# Patient Record
Sex: FEMALE | Race: WHITE | Employment: OTHER | URBAN - METROPOLITAN AREA
[De-identification: names, ages, dates, MRNs, and addresses within clinical notes are randomized per-mention and may not be internally consistent; named-entity substitution may affect disease eponyms.]

---

## 2017-10-17 DIAGNOSIS — I10 ESSENTIAL HYPERTENSION, BENIGN: Primary | ICD-10-CM

## 2017-10-17 RX ORDER — LISINOPRIL 10 MG/1
10 TABLET ORAL DAILY
Qty: 30 TABLET | Refills: 0 | Status: SHIPPED | OUTPATIENT
Start: 2017-10-17 | End: 2017-10-24 | Stop reason: DRUGHIGH

## 2017-10-17 NOTE — TELEPHONE ENCOUNTER
Patient has upcoming appt and needs a fill to hold her over till then.    lisinopril (PRINIVIL,ZESTRIL) 10 MG tablet      Last Written Prescription Date: 09/27/2016  Last Fill Quantity: 90, # refills: 3    Last Office Visit with [facility], [facility] or [facility] prescribing provider:  09/27/2016   Future Office Visit:    Next 5 appointments (look out 90 days)     Oct 24, 2017  8:40 AM CDT   Office Visit with Timo Ambrocio MD   [facility] [place] [place]    [address] [address] [phone]                    BP Readings from Last 3 Encounters:   09/27/16 (!) 148/92   04/09/15 140/80   03/16/15 (!) 144/92

## 2017-10-24 ENCOUNTER — OFFICE VISIT (OUTPATIENT)
Dept: INTERNAL MEDICINE | Facility: CLINIC | Age: 72
End: 2017-10-24
Payer: COMMERCIAL

## 2017-10-24 VITALS
SYSTOLIC BLOOD PRESSURE: 150 MMHG | TEMPERATURE: 97.7 F | OXYGEN SATURATION: 98 % | WEIGHT: 161.2 LBS | HEIGHT: 65 IN | HEART RATE: 103 BPM | BODY MASS INDEX: 26.86 KG/M2 | DIASTOLIC BLOOD PRESSURE: 80 MMHG

## 2017-10-24 DIAGNOSIS — I10 ESSENTIAL HYPERTENSION, BENIGN: Primary | ICD-10-CM

## 2017-10-24 DIAGNOSIS — Z12.31 VISIT FOR SCREENING MAMMOGRAM: ICD-10-CM

## 2017-10-24 DIAGNOSIS — Z71.89 COUNSELING REGARDING ADVANCED DIRECTIVES: ICD-10-CM

## 2017-10-24 DIAGNOSIS — Z23 NEED FOR PNEUMOCOCCAL VACCINATION: ICD-10-CM

## 2017-10-24 DIAGNOSIS — E78.5 HYPERLIPIDEMIA LDL GOAL <130: ICD-10-CM

## 2017-10-24 LAB
ALBUMIN SERPL-MCNC: 3.8 G/DL (ref 3.4–5)
ALP SERPL-CCNC: 105 U/L (ref 40–150)
ALT SERPL W P-5'-P-CCNC: 21 U/L (ref 0–50)
ANION GAP SERPL CALCULATED.3IONS-SCNC: 9 MMOL/L (ref 3–14)
AST SERPL W P-5'-P-CCNC: 19 U/L (ref 0–45)
BILIRUB SERPL-MCNC: 0.4 MG/DL (ref 0.2–1.3)
BUN SERPL-MCNC: 13 MG/DL (ref 7–30)
CALCIUM SERPL-MCNC: 9.2 MG/DL (ref 8.5–10.1)
CHLORIDE SERPL-SCNC: 107 MMOL/L (ref 94–109)
CHOLEST SERPL-MCNC: 201 MG/DL
CO2 SERPL-SCNC: 26 MMOL/L (ref 20–32)
CREAT SERPL-MCNC: 0.71 MG/DL (ref 0.52–1.04)
GFR SERPL CREATININE-BSD FRML MDRD: 81 ML/MIN/1.7M2
GLUCOSE SERPL-MCNC: 90 MG/DL (ref 70–99)
HDLC SERPL-MCNC: 93 MG/DL
LDLC SERPL CALC-MCNC: 93 MG/DL
NONHDLC SERPL-MCNC: 108 MG/DL
POTASSIUM SERPL-SCNC: 4.1 MMOL/L (ref 3.4–5.3)
PROT SERPL-MCNC: 7.4 G/DL (ref 6.8–8.8)
SODIUM SERPL-SCNC: 142 MMOL/L (ref 133–144)
TRIGL SERPL-MCNC: 73 MG/DL

## 2017-10-24 PROCEDURE — G0009 ADMIN PNEUMOCOCCAL VACCINE: HCPCS | Performed by: INTERNAL MEDICINE

## 2017-10-24 PROCEDURE — 90670 PCV13 VACCINE IM: CPT | Performed by: INTERNAL MEDICINE

## 2017-10-24 PROCEDURE — 99214 OFFICE O/P EST MOD 30 MIN: CPT | Performed by: INTERNAL MEDICINE

## 2017-10-24 PROCEDURE — 80061 LIPID PANEL: CPT | Performed by: INTERNAL MEDICINE

## 2017-10-24 PROCEDURE — 80053 COMPREHEN METABOLIC PANEL: CPT | Performed by: INTERNAL MEDICINE

## 2017-10-24 PROCEDURE — 36415 COLL VENOUS BLD VENIPUNCTURE: CPT | Performed by: INTERNAL MEDICINE

## 2017-10-24 RX ORDER — LISINOPRIL 10 MG/1
10 TABLET ORAL DAILY
Qty: 90 TABLET | Refills: 3 | Status: CANCELLED | OUTPATIENT
Start: 2017-10-24

## 2017-10-24 RX ORDER — LISINOPRIL 20 MG/1
20 TABLET ORAL DAILY
Qty: 90 TABLET | Refills: 3 | Status: SHIPPED | OUTPATIENT
Start: 2017-10-24 | End: 2018-10-30

## 2017-10-24 NOTE — PROGRESS NOTES
SUBJECTIVE:   Leticia Forte is a 72 year old female who presents to clinic today for the following health issues:    Prevnar- Declines      Hypertension Follow-up      Outpatient blood pressures are not being checked.    Low Salt Diet: not monitoring salt        Amount of exercise or physical activity: Walking daily     Problems taking medications regularly: No    Medication side effects: none    Diet: regular (no restrictions)      Problem list and histories reviewed & adjusted, as indicated.  Additional history: as documented    Patient Active Problem List   Diagnosis     Vertigo     Hyperlipidemia LDL goal <130     Essential hypertension, benign     Counseling regarding advanced directives     History reviewed. No pertinent surgical history.    Social History   Substance Use Topics     Smoking status: Never Smoker     Smokeless tobacco: Never Used     Alcohol use Yes      Comment: Socially      Family History   Problem Relation Age of Onset     Family history unknown: Yes         Current Outpatient Prescriptions   Medication Sig Dispense Refill     lisinopril (PRINIVIL/ZESTRIL) 10 MG tablet Take 1 tablet (10 mg) by mouth daily 30 tablet 0     Allergies   Allergen Reactions     Codeine GI Disturbance     Sulfa Drugs Rash     BP Readings from Last 3 Encounters:   09/27/16 (!) 148/92   04/09/15 140/80   03/16/15 (!) 144/92    Wt Readings from Last 3 Encounters:   09/27/16 150 lb 14.4 oz (68.4 kg)   04/09/15 156 lb (70.8 kg)   03/16/15 171 lb (77.6 kg)         Labs reviewed in EPIC        Reviewed and updated as needed this visit by clinical staffTobacco  Allergies  Med Hx  Surg Hx  Fam Hx  Soc Hx      Reviewed and updated as needed this visit by Provider         ROS:  C: NEGATIVE for fever, chills, change in weight  E/M: NEGATIVE for ear, mouth and throat problems  R: NEGATIVE for significant cough or SOB  CV: NEGATIVE for chest pain, palpitations or peripheral edema  GI: NEGATIVE for nausea, abdominal pain,  "heartburn, or change in bowel habits  : NEGATIVE for frequency, dysuria, or hematuria  M: NEGATIVE for significant arthralgias or myalgia  H: NEGATIVE for bleeding problems  P: NEGATIVE for changes in mood or affect    OBJECTIVE:                                                    /80  Pulse 103  Temp 97.7  F (36.5  C) (Oral)  Ht 5' 5\" (1.651 m)  Wt 161 lb 3.2 oz (73.1 kg)  SpO2 98%  BMI 26.83 kg/m2  Body mass index is 26.83 kg/(m^2).  GENERAL: healthy, alert and no distress  EYES: Eyes grossly normal to inspection, extraocular movements - intact, and PERRL  HENT: ear canals- normal; TMs- normal; Nose- normal; Mouth- no ulcers, no lesions  NECK: no tenderness, no adenopathy, no asymmetry, no masses, no stiffness; thyroid- normal to palpation  RESP: lungs clear to auscultation - no rales, no rhonchi, no wheezes  CV: regular rates and rhythm, normal S1 S2, no S3 or S4 and no murmur, no click or rub -  MS: extremities- no gross deformities noted, no edema  NEURO: strength and tone- normal, sensory exam- grossly normal, mentation- intact, speech- normal, reflexes- symmetric  PSYCH: Alert and oriented times 3; speech- coherent , normal rate and volume; able to articulate logical thoughts, able to abstract reason, no tangential thoughts, no hallucinations or delusions, affect- normal     ASSESSMENT/PLAN:                                                      (I10) Essential hypertension, benign  (primary encounter diagnosis)  Comment: suggest increase Lisinopril to 20mg daily  Plan: Comprehensive metabolic panel, lisinopril         (PRINIVIL/ZESTRIL) 20 MG tablet        BP check 6 weeks    (E78.5) Hyperlipidemia LDL goal <130  Comment: labs as fasting  Plan: Comprehensive metabolic panel, Lipid Profile            (Z12.31) Visit for screening mammogram  Comment: advsied and ordered again  Plan: *MA Screening Digital Bilateral            (Z71.89) Counseling regarding advanced directives  Comment: advised  Plan: "     See Patient Instructions    Timo Ambrocio MD  [facility]    25 minutes spent with this patient, face to face, discussing treatment options for listed problems above as well as side effects of appropriate medications.  Counseling time extended beyond 50% of the clinic visit.  Medication dosing, treatment plan and follow-up were discussed. Also reviewed need for primary care testing for patient.

## 2017-10-24 NOTE — LETTER
[facility] [place]  [address] [address]  [phone]      10/24/2017       Leticia Jaureguilisa  [address] [facility] [address]        Dear Leticia,    I am pleased to inform you that your routine blood work including your sodium, potassium, calcium, kidney and liver function tests are all normal.    Your cholesterol looks good and I would not change anything at this point but would repeat your labs in 12 months.      Sincerely,      Timo Ambrocio MD  Internal Medicine

## 2017-10-24 NOTE — MR AVS SNAPSHOT
"              After Visit Summary   10/24/2017    Leticia Forte    MRN: [id number]           Patient Information     Date Of Birth          1945        Visit Information        Provider Department      10/24/2017 8:40 AM Timo Ambrocio MD [facility]        Today's Diagnoses     Essential hypertension, benign    -  1    Hyperlipidemia LDL goal <130        Visit for screening mammogram        Counseling regarding advanced directives           Follow-ups after your visit        Follow-up notes from your care team     Return if symptoms worsen or fail to improve, for BP Recheck.      Future tests that were ordered for you today     Open Future Orders        Priority Expected Expires Ordered    *MA Screening Digital Bilateral Routine  10/24/2018 10/24/2017            Who to contact     If you have questions or need follow up information about today's clinic visit or your schedule please contact [facility] directly at [phone].  Normal or non-critical lab and imaging results will be communicated to you by MyChart, letter or phone within 4 business days after the clinic has received the results. If you do not hear from us within 7 days, please contact the clinic through MyChart or phone. If you have a critical or abnormal lab result, we will notify you by phone as soon as possible.  Submit refill requests through [employer] or call your pharmacy and they will forward the refill request to us. Please allow 3 business days for your refill to be completed.          Additional Information About Your Visit        MyChart Information     [employer] lets you send messages to your doctor, view your test results, renew your prescriptions, schedule appointments and more. To sign up, go to [email or web address] . Click on \"Log in\" on the left side of the screen, which will take you to the Welcome page. Then click on \"Sign up Now\" on the right side of the page.     You will be " "asked to enter the access code listed below, as well as some personal information. Please follow the directions to create your username and password.     Your access code is: [email or web address]  Expires: 2018  9:13 AM     Your access code will  in 90 days. If you need help or a new code, please call your [place] clinic or [phone].        Care EveryWhere ID     This is your Care EveryWhere ID. This could be used by other organizations to access your [place] medical records  [id number]        Your Vitals Were     Pulse Temperature Height Pulse Oximetry BMI (Body Mass Index)       103 97.7  F (36.5  C) (Oral) 5' 5\" (1.651 m) 98% 26.83 kg/m2        Blood Pressure from Last 3 Encounters:   10/24/17 150/80   16 (!) 148/92   04/09/15 140/80    Weight from Last 3 Encounters:   10/24/17 161 lb 3.2 oz (73.1 kg)   16 150 lb 14.4 oz (68.4 kg)   04/09/15 156 lb (70.8 kg)              We Performed the Following     Comprehensive metabolic panel     Lipid Profile          Today's Medication Changes          These changes are accurate as of: 10/24/17  9:13 AM.  If you have any questions, ask your nurse or doctor.               These medicines have changed or have updated prescriptions.        Dose/Directions    lisinopril 20 MG tablet   Commonly known as:  PRINIVIL/ZESTRIL   This may have changed:    - medication strength  - how much to take   Used for:  Essential hypertension, benign   Changed by:  Timo Ambrocio MD        Dose:  20 mg   Take 1 tablet (20 mg) by mouth daily   Quantity:  90 tablet   Refills:  3            Where to get your medicines      These medications were sent to [id number]  [address] AT [facility] [address], [facility] [place] [address]     Phone:  [phone]     lisinopril 20 MG tablet                Primary Care Provider Office Phone # Fax #    Timo Ambrocio [phone] [address] [place] [address] " [place] [address]        Equal Access to Services     BERNADINE YUE : Hadii pedro reid javier Akins, warafaelda luqsaadha, qarossita waldemaryvettekathryn valdezbernardchristine shah. So [facility] [phone].    ATENCIÓN: Si habla español, tiene a de la o disposición servicios gratuitos de asistencia lingüística. Llame al [phone].    We comply with applicable federal civil rights laws and [place] laws. We do not discriminate on the basis of race, color, national origin, age, disability, sex, sexual orientation, or gender identity.            Thank you!     Thank you for choosing [facility]  for your care. Our goal is always to provide you with excellent care. Hearing back from our patients is one way we can continue to improve our services. Please take a few minutes to complete the written survey that you may receive in the mail after your visit with us. Thank you!             Your Updated Medication List - Protect others around you: Learn how to safely use, store and throw away your medicines at www.disposemymeds.org.          This list is accurate as of: 10/24/17  9:13 AM.  Always use your most recent med list.                   Brand Name Dispense Instructions for use Diagnosis    lisinopril 20 MG tablet    PRINIVIL/ZESTRIL    90 tablet    Take 1 tablet (20 mg) by mouth daily    Essential hypertension, benign

## 2018-03-21 ENCOUNTER — OFFICE VISIT (OUTPATIENT)
Dept: URGENT CARE | Facility: URGENT CARE | Age: 73
End: 2018-03-21
Payer: COMMERCIAL

## 2018-03-21 VITALS
TEMPERATURE: 97.8 F | OXYGEN SATURATION: 98 % | WEIGHT: 161 LBS | HEART RATE: 88 BPM | BODY MASS INDEX: 26.79 KG/M2 | RESPIRATION RATE: 18 BRPM | DIASTOLIC BLOOD PRESSURE: 80 MMHG | SYSTOLIC BLOOD PRESSURE: 110 MMHG

## 2018-03-21 DIAGNOSIS — R05.9 COUGH: Primary | ICD-10-CM

## 2018-03-21 PROCEDURE — 99213 OFFICE O/P EST LOW 20 MIN: CPT | Performed by: FAMILY MEDICINE

## 2018-03-21 RX ORDER — AMOXICILLIN 500 MG/1
500 CAPSULE ORAL 3 TIMES DAILY
Qty: 30 CAPSULE | Refills: 0 | Status: SHIPPED | OUTPATIENT
Start: 2018-03-21 | End: 2018-11-05

## 2018-03-21 NOTE — PROGRESS NOTES
SUBJECTIVE:   Leticia Forte is a 73 year old female presenting with a chief complaint of chills, cough - productive and facial pain/pressure.  Onset of symptoms was 2 week(s) ago.  Course of illness is worsening.    Severity moderately severe  Current and Associated symptoms: body aches  Treatment measures tried include Tylenol/Ibuprofen.  Predisposing factors include None.    No past medical history on file.  Current Outpatient Prescriptions   Medication Sig Dispense Refill     lisinopril (PRINIVIL/ZESTRIL) 20 MG tablet Take 1 tablet (20 mg) by mouth daily 90 tablet 3     Social History   Substance Use Topics     Smoking status: Never Smoker     Smokeless tobacco: Never Used     Alcohol use Yes      Comment: Socially        ROS:  INTEGUMENTARY/SKIN: NEGATIVE for worrisome rashes, moles or lesions  EYES: NEGATIVE for vision changes or irritation    OBJECTIVE:  /80  Pulse 88  Temp 97.8  F (36.6  C) (Oral)  Resp 18  Wt 161 lb (73 kg)  SpO2 98%  BMI 26.79 kg/m2  GENERAL APPEARANCE: healthy, alert and no distress  EYES: EOMI,  PERRL, conjunctiva clear  HENT: ear canals and TM's normal.  Nose and mouth without ulcers, erythema or lesions  NECK: supple, nontender, no lymphadenopathy  RESP: decreased breath sounds  CV: regular rates and rhythm, normal S1 S2, no murmur noted  NEURO: Normal strength and tone, sensory exam grossly normal,  normal speech and mentation  SKIN: no suspicious lesions or rashes    ASSESSMENT:  1. Cough  Symptomatic cares were discussed in detail.   Pt instructed to come back to the clinic for worsening sx    - amoxicillin (AMOXIL) 500 MG capsule; Take 1 capsule (500 mg) by mouth 3 times daily  Dispense: 30 capsule; Refill: 0

## 2018-03-21 NOTE — MR AVS SNAPSHOT
"              After Visit Summary   3/21/2018    Leticia Forte    MRN: [id number]           Patient Information     Date Of Birth          1945        Visit Information        Provider Department      3/21/2018 9:15 AM Alexey Singh MD [facility]        Today's Diagnoses     Cough    -  1       Follow-ups after your visit        Who to contact     If you have questions or need follow up information about today's clinic visit or your schedule please contact [facility] directly at [phone].  Normal or non-critical lab and imaging results will be communicated to you by [employer], letter or phone within 4 business days after the clinic has received the results. If you do not hear from us within 7 days, please contact the clinic through [employer] or phone. If you have a critical or abnormal lab result, we will notify you by phone as soon as possible.  Submit refill requests through [employer] or call your pharmacy and they will forward the refill request to us. Please allow 3 business days for your refill to be completed.          Additional Information About Your Visit        MyChart Information     [employer] lets you send messages to your doctor, view your test results, renew your prescriptions, schedule appointments and more. To sign up, go to [email or web address] . Click on \"Log in\" on the left side of the screen, which will take you to the Welcome page. Then click on \"Sign up Now\" on the right side of the page.     You will be asked to enter the access code listed below, as well as some personal information. Please follow the directions to create your username and password.     Your access code is: [email or web address]  Expires: 2018 10:03 AM     Your access code will  in 90 days. If you need help or a new code, please call your [place] clinic or [phone].        Care EveryWhere ID     This is your Care EveryWhere ID. This could be used by other " organizations to access your [facility] medical records  [id number]        Your Vitals Were     Pulse Temperature Respirations Pulse Oximetry BMI (Body Mass Index)       88 97.8  F (36.6  C) (Oral) 18 98% 26.79 kg/m2        Blood Pressure from Last 3 Encounters:   03/21/18 110/80   10/24/17 150/80   09/27/16 (!) 148/92    Weight from Last 3 Encounters:   03/21/18 161 lb (73 kg)   10/24/17 161 lb 3.2 oz (73.1 kg)   09/27/16 150 lb 14.4 oz (68.4 kg)              Today, you had the following     No orders found for display         Today's Medication Changes          These changes are accurate as of 3/21/18 10:03 AM.  If you have any questions, ask your nurse or doctor.               Start taking these medicines.        Dose/Directions    amoxicillin 500 MG capsule   Commonly known as:  AMOXIL   Used for:  Cough   Started by:  Alexey Singh MD        Dose:  500 mg   Take 1 capsule (500 mg) by mouth 3 times daily   Quantity:  30 capsule   Refills:  0            Where to get your medicines      These medications were sent to [facility] [address] [place] [address] [address] AT [place] [address] [address], [facility] [address]    Hours:  24-hours Phone:  [phone]     amoxicillin 500 MG capsule                Primary Care Provider Office Phone # Fax #    Timo Ambrocio [phone]       [address] [place] [address]        Equal Access to Services     BERNADINE TURNER AH: Hadii pedro reid hadasho Soomaali, waaxda luqadaha, qaybta kaalmada adeegyapari, waxay idijesus shah. So [facility] [phone].    ATENCIÓN: Si habla español, tiene a de la o disposición servicios gratuitos de asistencia lingüística. Llame al [phone].    We comply with applicable federal civil rights laws and [place] laws. We do not discriminate on the basis of race, color, national origin, age, disability, sex, sexual orientation, or gender identity.            Thank you!      Thank you for choosing [facility]  for your care. Our goal is always to provide you with excellent care. Hearing back from our patients is one way we can continue to improve our services. Please take a few minutes to complete the written survey that you may receive in the mail after your visit with us. Thank you!             Your Updated Medication List - Protect others around you: Learn how to safely use, store and throw away your medicines at www.disposemymeds.org.          This list is accurate as of 3/21/18 10:03 AM.  Always use your most recent med list.                   Brand Name Dispense Instructions for use Diagnosis    amoxicillin 500 MG capsule    AMOXIL    30 capsule    Take 1 capsule (500 mg) by mouth 3 times daily    Cough       lisinopril 20 MG tablet    PRINIVIL/ZESTRIL    90 tablet    Take 1 tablet (20 mg) by mouth daily    Essential hypertension, benign

## 2018-05-23 ENCOUNTER — TELEPHONE (OUTPATIENT)
Dept: INTERNAL MEDICINE | Facility: CLINIC | Age: 73
End: 2018-05-23

## 2018-05-23 NOTE — LETTER
[facility]  [address] [place] [address]  [phone]  May 23, 2018    Leticia Forte  [address] [facility] [address]    Dear Leticia,    We care about your health and based on a review of your medical records, recommend the the following, to better manage your health:      You are in particular need of attention regarding:  -Colon Cancer Screening  -Cervical Cancer Screening    I am recommending that you:     -schedule a MAMMOGRAM which is due.    1 in 8 women will develop invasive breast cancer during her lifetime and it is the most common non-skin cancer in American women.  EARLY detection, new treatments, and a better understanding of the disease have increased survival rates - the 5 year survival rate in the 1960s was 63% and today it is close to 90%.    If you are under/uninsured, we recommend you contact the [facility]. They offer mammograms at no charge or on a sliding fee charge. You can schedule with them at [phone]. Please have them send us the results.      Please disregard this reminder if you have had this exam elsewhere within the last year.  It would be helpful for us to have a copy of your mammogram report in your file so that we can best coordinate your care - please contact us with when your test was done so we can update your record.             -schedule a COLONOSCOPY to look for colon cancer (due every 10 years or 5 years in higher risk situations.)        Colon cancer is now the second leading cause of cancer-related deaths in the [place] for both men and women and there are over 130,000 new cases and 50,000 deaths per year from colon cancer.  Colonoscopies can prevent 90-95% of these deaths.  Problem lesions can be removed before they ever become cancer.  This test is not only looking for cancer, but also getting rid of precancerious lesions.    If you are under/uninsured, we recommend you contact  the [employer] program. [employer] is a free colorectal cancer screening program that provides colonoscopies for eligible under/uninsured [place] men and women. If you are interested in receiving a free colonoscopy, please call [employer] at [phone] (mention code [employer]) to see if you re eligible.      If you do not wish to do a colonoscopy or cannot afford to do one, at this time, there is another option. It is called a FIT test or Fecal Immunochemical Occult Blood Test (take home stool sample kit).  It does not replace the colonoscopy for colorectal cancer screening, but it can detect hidden bleeding in the lower colon.  It does need to be repeated every year and if a positive result is obtained, you would be referred for a colonoscopy.          If you have completed either one of these tests at another facility, please call with the details of when and where the tests were done and if they were normal or not. Or have the records sent to our clinic so that we can best coordinate your care.      Here is a list of Health Maintenance topics that are due now or due soon:  Health Maintenance Due   Topic Date Due     Tetanus Vaccine - every 10 years  02/07/1962     Colon Cancer Screening - every 10 years.  02/07/1994     Bone Density Screening (Dexa)  02/07/2009     Mammogram - every 2 years  03/30/2017       Please call us at [phone] or [phone] (or use [email or web address]) to address the above recommendations.     Thank you for trusting [facility].  We appreciate the opportunity to serve you and look forward to supporting your healthcare needs in the future.    If you have (or plan to have) any of these tests done at a facility other than a [facility] or a [facility], please have the results from these tests sent to your primary physician at [facility].    Healthy Regards,    Timo Ambrocio MD/Linda Perez CMA

## 2018-05-23 NOTE — TELEPHONE ENCOUNTER
Panel Management Review    Patient Active Problem List   Diagnosis     Vertigo     Hyperlipidemia LDL goal <130     Essential hypertension, benign     Counseling regarding advanced directives       Patient has the following on her problem list:     Hypertension   Last three blood pressure readings:  BP Readings from Last 3 Encounters:   03/21/18 110/80   10/24/17 150/80   09/27/16 (!) 148/92     Blood pressure: Passed    HTN Guidelines:  Age 18-59 BP range:  Less than 140/90  Age 60-85 with Diabetes:  Less than 140/90  Age 60-85 without Diabetes:  less than 150/90      Composite cancer screening  Chart review shows that this patient is due/due soon for the following Mammogram and Colonoscopy  Summary:    Patient is due/failing the following:   COLONOSCOPY and MAMMOGRAM    Action needed:   Patient needs office visit for mammo and colonoscopy.    Type of outreach:    Sent letter.    Questions for provider review:    None                                                                                                                                    Linda Perez CMA        .

## 2018-05-26 ENCOUNTER — TELEPHONE (OUTPATIENT)
Dept: INTERNAL MEDICINE | Facility: CLINIC | Age: 73
End: 2018-05-26

## 2018-05-26 NOTE — TELEPHONE ENCOUNTER
5/26/2018    Call Regarding [facility] Mammogram    Attempt 1    Message on voicemail    Patient is also due for  Outreach   SV

## 2018-10-30 DIAGNOSIS — I10 ESSENTIAL HYPERTENSION, BENIGN: ICD-10-CM

## 2018-10-30 RX ORDER — LISINOPRIL 20 MG/1
20 TABLET ORAL DAILY
Qty: 30 TABLET | Refills: 0 | Status: SHIPPED | OUTPATIENT
Start: 2018-10-30 | End: 2018-11-04

## 2018-10-30 NOTE — TELEPHONE ENCOUNTER
Prescription approved per [facility].  Has upcoming appointment 11/5/18

## 2018-10-30 NOTE — TELEPHONE ENCOUNTER
Reason for Call:  Medication or medication refill    Do you use a [facility]?  Name of the pharmacy and phone number for the current request:  [facility] [address] [place] - [phone] Fax [phone]    Name of the medication requested lisinopril 20 mg    Other request pt needs refill until her appointment with dr ladd 11/5/18    Can we leave a detailed message on this number? YES    Phone number patient can be reached at: Cell number on file [phone]  No relevant phone numbers on file.       Best Time anytime     Call taken on 10/30/2018 at 9:13 AM by Kelly Gotti

## 2018-10-30 NOTE — TELEPHONE ENCOUNTER
Requested Prescriptions   Pending Prescriptions Disp Refills     lisinopril (PRINIVIL/ZESTRIL) 20 MG tablet 90 tablet 3     Sig: Take 1 tablet (20 mg) by mouth daily    There is no refill protocol information for this order        Last Written Prescription Date:  10/24/17  Last Fill Quantity: 90,  # refills: 3   Last office visit: 10/24/2017 with prescribing provider:  10/24/17   Future Office Visit:   Next 5 appointments (look out 90 days)     Nov 05, 2018  9:40 AM CST   Office Visit with Timo Ambrocio MD   [facility] ([facility])    [address] [address] [phone]

## 2018-11-05 ENCOUNTER — OFFICE VISIT (OUTPATIENT)
Dept: INTERNAL MEDICINE | Facility: CLINIC | Age: 73
End: 2018-11-05
Payer: COMMERCIAL

## 2018-11-05 VITALS
BODY MASS INDEX: 26.9 KG/M2 | HEART RATE: 84 BPM | RESPIRATION RATE: 14 BRPM | WEIGHT: 161.47 LBS | SYSTOLIC BLOOD PRESSURE: 148 MMHG | OXYGEN SATURATION: 98 % | HEIGHT: 65 IN | DIASTOLIC BLOOD PRESSURE: 86 MMHG | TEMPERATURE: 98 F

## 2018-11-05 DIAGNOSIS — E78.5 HYPERLIPIDEMIA LDL GOAL <130: ICD-10-CM

## 2018-11-05 DIAGNOSIS — I10 ESSENTIAL HYPERTENSION, BENIGN: Primary | ICD-10-CM

## 2018-11-05 DIAGNOSIS — Z23 NEED FOR PROPHYLACTIC VACCINATION AGAINST STREPTOCOCCUS PNEUMONIAE (PNEUMOCOCCUS): ICD-10-CM

## 2018-11-05 DIAGNOSIS — Z12.11 SCREEN FOR COLON CANCER: ICD-10-CM

## 2018-11-05 DIAGNOSIS — Z78.0 ASYMPTOMATIC POSTMENOPAUSAL STATUS: ICD-10-CM

## 2018-11-05 DIAGNOSIS — Z23 NEED FOR PROPHYLACTIC VACCINATION AND INOCULATION AGAINST INFLUENZA: ICD-10-CM

## 2018-11-05 DIAGNOSIS — Z12.31 VISIT FOR SCREENING MAMMOGRAM: ICD-10-CM

## 2018-11-05 DIAGNOSIS — Z23 NEED FOR PROPHYLACTIC VACCINATION WITH TETANUS-DIPHTHERIA (TD): ICD-10-CM

## 2018-11-05 PROCEDURE — 99214 OFFICE O/P EST MOD 30 MIN: CPT | Mod: 25 | Performed by: INTERNAL MEDICINE

## 2018-11-05 PROCEDURE — G0009 ADMIN PNEUMOCOCCAL VACCINE: HCPCS | Performed by: INTERNAL MEDICINE

## 2018-11-05 PROCEDURE — 90732 PPSV23 VACC 2 YRS+ SUBQ/IM: CPT | Performed by: INTERNAL MEDICINE

## 2018-11-05 RX ORDER — LISINOPRIL 20 MG/1
20 TABLET ORAL 2 TIMES DAILY
Qty: 180 TABLET | Refills: 3 | Status: SHIPPED | OUTPATIENT
Start: 2018-11-05 | End: 2019-12-20

## 2018-11-05 NOTE — MR AVS SNAPSHOT
After Visit Summary   11/5/2018    Leticia Forte    MRN: [id number]           Patient Information     Date Of Birth          1945        Visit Information        Provider Department      11/5/2018 9:40 AM Timo Ambrocio MD [facility]        Today's Diagnoses     Essential hypertension, benign    -  1    Hyperlipidemia LDL goal <130        Screen for colon cancer        Asymptomatic postmenopausal status        Visit for screening mammogram        Need for prophylactic vaccination and inoculation against influenza        Need for prophylactic vaccination against Streptococcus pneumoniae (pneumococcus)        Need for prophylactic vaccination with tetanus-diphtheria (Td)           Follow-ups after your visit        Additional Services     GASTROENTEROLOGY ADULT REF PROCEDURE ONLY Lou Huff [phone]; No Provider Preference       Last Lab Result: Creatinine (mg/dL)       Date                     Value                 10/24/2017               0.71             ----------  There is no height or weight on file to calculate BMI.     Needed:  No  Language:  English    Patient will be contacted to schedule procedure.     Please be aware that coverage of these services is subject to the terms and limitations of your health insurance plan.  Call member services at your health plan with any benefit or coverage questions.  Any procedures must be performed at a [place] facility OR coordinated by your clinic's referral office.    Please bring the following with you to your appointment:    (1) Any X-Rays, CTs or MRIs which have been performed.  Contact the facility where they were done to arrange for  prior to your scheduled appointment.    (2) List of current medications   (3) This referral request   (4) Any documents/labs given to you for this referral                  Future tests that were ordered for you today     Open Future Orders        Priority  "Expected Expires Ordered    Comprehensive metabolic panel Routine 11/5/2018 11/30/2018 11/5/2018    Lipid Profile Routine 11/5/2018 11/30/2018 11/5/2018    DEXA HIP/PELVIS/SPINE - Future Routine  11/5/2019 11/5/2018    MA SCREENING DIGITAL BILAT - Future  (s+30) Routine  11/5/2019 11/5/2018            Who to contact     If you have questions or need follow up information about today's clinic visit or your schedule please contact [facility] directly at [phone].  Normal or non-critical lab and imaging results will be communicated to you by MyChart, letter or phone within 4 business days after the clinic has received the results. If you do not hear from us within 7 days, please contact the clinic through MyChart or phone. If you have a critical or abnormal lab result, we will notify you by phone as soon as possible.  Submit refill requests through [employer] or call your pharmacy and they will forward the refill request to us. Please allow 3 business days for your refill to be completed.          Additional Information About Your Visit        Care EveryWhere ID     This is your Care EveryWhere ID. This could be used by other organizations to access your [facility] medical records  [id number]        Your Vitals Were     Pulse Temperature Respirations Height Pulse Oximetry BMI (Body Mass Index)    84 98  F (36.7  C) (Oral) 14 5' 5\" (1.651 m) 98% 26.87 kg/m2       Blood Pressure from Last 3 Encounters:   11/05/18 148/86   03/21/18 110/80   10/24/17 150/80    Weight from Last 3 Encounters:   11/05/18 161 lb 7.5 oz (73.2 kg)   03/21/18 161 lb (73 kg)   10/24/17 161 lb 3.2 oz (73.1 kg)              We Performed the Following     ADMIN MEDICARE: Pneumococcal Vaccine ()     GASTROENTEROLOGY ADULT REF PROCEDURE ONLY Lou Huff [phone]; No Provider Preference     Pneumococcal vaccine 23 valent PPSV23  (Pneumovax) [69236]          Today's Medication Changes          These changes " are accurate as of 11/5/18  9:55 AM.  If you have any questions, ask your nurse or doctor.               These medicines have changed or have updated prescriptions.        Dose/Directions    lisinopril 20 MG tablet   Commonly known as:  PRINIVIL/ZESTRIL   This may have changed:  when to take this   Used for:  Essential hypertension, benign   Changed by:  Timo Ambrocio MD        Dose:  20 mg   Take 1 tablet (20 mg) by mouth 2 times daily   Quantity:  180 tablet   Refills:  3            Where to get your medicines      These medications were sent to [facility] [id number] [place] [address] [address] AT [facility] & [address] [place], [place] [address] [address], [facility] [address]     Phone:  [phone]     lisinopril 20 MG tablet                Primary Care Provider Office Phone # Fax #    Timo Ambrocio [phone]       [address] [facility] [address]        Equal Access to Services     BERNADINE TURNER : Hadii aad ku hadasho Soomaali, waaxda luqadaha, qaybta kaalmada adeegyada, waxay idiin hayaan adeeg kharash la'isacn . So [facility] [phone].    ATENCIÓN: Si habla español, tiene a de la o disposición servicios gratuitos de asistencia lingüística. [facility] [phone].    We comply with applicable federal civil rights laws and [place] laws. We do not discriminate on the basis of race, color, national origin, age, disability, sex, sexual orientation, or gender identity.            Thank you!     Thank you for choosing [facility]  for your care. Our goal is always to provide you with excellent care. Hearing back from our patients is one way we can continue to improve our services. Please take a few minutes to complete the written survey that you may receive in the mail after your visit with us. Thank you!             Your Updated Medication List - Protect others around you: Learn how to safely use, store and throw away your medicines at www.disposemymeds.org.           This list is accurate as of 11/5/18  9:55 AM.  Always use your most recent med list.                   Brand Name Dispense Instructions for use Diagnosis    lisinopril 20 MG tablet    PRINIVIL/ZESTRIL    180 tablet    Take 1 tablet (20 mg) by mouth 2 times daily    Essential hypertension, benign

## 2018-11-05 NOTE — LETTER
[facility]  [address] [id number] [phone]        March 5, 2019    Leticia Forte  [address] [facility] [address]              Dear Leticia Forte    This is to remind you that your fasting labs is due.    You may call our office at [phone] to schedule an appointment.    Please disregard this notice if you have already had your labs drawn or made an appointment.        Sincerely,        Timo Ambrocio MD

## 2019-12-20 DIAGNOSIS — I10 ESSENTIAL HYPERTENSION, BENIGN: ICD-10-CM

## 2019-12-20 RX ORDER — LISINOPRIL 20 MG/1
20 TABLET ORAL 2 TIMES DAILY
Qty: 60 TABLET | Refills: 0 | Status: SHIPPED | OUTPATIENT
Start: 2019-12-20 | End: 2020-01-06

## 2019-12-20 NOTE — TELEPHONE ENCOUNTER
"Requested Prescriptions   Pending Prescriptions Disp Refills     lisinopril (PRINIVIL/ZESTRIL) 20 MG tablet 180 tablet 3     Sig: Take 1 tablet (20 mg) by mouth 2 times daily       ACE Inhibitors (Including Combos) Protocol Failed - 12/20/2019 10:08 AM        Failed - Blood pressure under 140/90 in past 12 months     BP Readings from Last 3 Encounters:   11/05/18 148/86   03/21/18 110/80   10/24/17 150/80                 Failed - Normal serum creatinine on file in past 12 months     Recent Labs   Lab Test 10/24/17  0927   CR 0.71             Failed - Normal serum potassium on file in past 12 months     Recent Labs   Lab Test 10/24/17  0927   POTASSIUM 4.1             Passed - Recent (12 mo) or future (30 days) visit within the authorizing provider's specialty     Patient has had an office visit with the authorizing provider or a provider within the authorizing providers department within the previous 12 mos or has a future within next 30 days. See \"Patient Info\" tab in inbasket, or \"Choose Columns\" in Meds & Orders section of the refill encounter.              Passed - Medication is active on med list        Passed - Patient is age 18 or older        Passed - No active pregnancy on record        Passed - No positive pregnancy test within past 12 months          Medication is being filled for 1 time refill only due to:  Patient needs to be seen because it has been more than one year since last visit.       Future Office Visit:   Next 5 appointments (look out 90 days)    Jan 06, 2020  7:40 AM CST  SHORT with Timo Ambrocio MD  [facility] ([facility] [address] [place] [address] [phone]           Prescription approved per [facility] Refill Protocol.    Alem KEMPN, RN, PHN      "

## 2020-01-06 ENCOUNTER — OFFICE VISIT (OUTPATIENT)
Dept: INTERNAL MEDICINE | Facility: CLINIC | Age: 75
End: 2020-01-06
Payer: COMMERCIAL

## 2020-01-06 VITALS
BODY MASS INDEX: 26.37 KG/M2 | OXYGEN SATURATION: 98 % | DIASTOLIC BLOOD PRESSURE: 86 MMHG | HEIGHT: 65 IN | WEIGHT: 158.3 LBS | HEART RATE: 89 BPM | SYSTOLIC BLOOD PRESSURE: 160 MMHG | TEMPERATURE: 98 F | RESPIRATION RATE: 15 BRPM

## 2020-01-06 DIAGNOSIS — E78.5 HYPERLIPIDEMIA LDL GOAL <130: ICD-10-CM

## 2020-01-06 DIAGNOSIS — Z12.11 SCREEN FOR COLON CANCER: ICD-10-CM

## 2020-01-06 DIAGNOSIS — Z12.31 VISIT FOR SCREENING MAMMOGRAM: ICD-10-CM

## 2020-01-06 DIAGNOSIS — I10 ESSENTIAL HYPERTENSION, BENIGN: ICD-10-CM

## 2020-01-06 DIAGNOSIS — Z78.0 ASYMPTOMATIC POSTMENOPAUSAL STATUS: ICD-10-CM

## 2020-01-06 PROCEDURE — 99214 OFFICE O/P EST MOD 30 MIN: CPT | Performed by: INTERNAL MEDICINE

## 2020-01-06 RX ORDER — LISINOPRIL 20 MG/1
20 TABLET ORAL 2 TIMES DAILY
Qty: 180 TABLET | Refills: 3 | Status: SHIPPED | OUTPATIENT
Start: 2020-01-06 | End: 2021-01-21

## 2020-01-06 RX ORDER — AMLODIPINE BESYLATE 5 MG/1
5 TABLET ORAL DAILY
Qty: 90 TABLET | Refills: 1 | Status: SHIPPED | OUTPATIENT
Start: 2020-01-06 | End: 2020-06-30

## 2020-01-06 ASSESSMENT — MIFFLIN-ST. JEOR: SCORE: 1213.92

## 2020-01-06 NOTE — PROGRESS NOTES
Subjective     Leticia Forte is a 74 year old female who presents to clinic today for the following health issues:    HPI     She is here today for follow-up primarily of her blood pressure.  She has not been very good at following up to get her lab work done or any of her preventative maintenance testing despite multiple advisements in the past.  She again shows up today for a blood pressure check stating that she is compliant with her medications but did not follow through and get her labs done that were placed last year.  She denies any major complaints but does note that she is concerned that her blood pressure is slightly high.      Hypertension Follow-up      Do you check your blood pressure regularly outside of the clinic? No     Are you following a low salt diet? No    Are your blood pressures ever more than 140 on the top number (systolic) OR more   than 90 on the bottom number (diastolic), for example 140/90? N/a       How many servings of fruits and vegetables do you eat daily?  4 or more    On average, how many sweetened beverages do you drink each day (Examples: soda, juice, sweet tea, etc.  Do NOT count diet or artificially sweetened beverages)?   1    How many days per week do you miss taking your medication? 0      Patient Active Problem List   Diagnosis     Vertigo     Hyperlipidemia LDL goal <130     Essential hypertension, benign     Counseling regarding advanced directives     No past surgical history on file.    Social History     Tobacco Use     Smoking status: Never Smoker     Smokeless tobacco: Never Used   Substance Use Topics     Alcohol use: Yes     Comment: Socially      Family History   Family history unknown: Yes         Current Outpatient Medications   Medication Sig Dispense Refill     lisinopril (PRINIVIL/ZESTRIL) 20 MG tablet Take 1 tablet (20 mg) by mouth 2 times daily 60 tablet 0     Allergies   Allergen Reactions     Codeine GI Disturbance     Sulfa Drugs Rash     BP Readings from  "Last 3 Encounters:   11/05/18 148/86   03/21/18 110/80   10/24/17 150/80    Wt Readings from Last 3 Encounters:   11/05/18 73.2 kg (161 lb 7.5 oz)   03/21/18 73 kg (161 lb)   10/24/17 73.1 kg (161 lb 3.2 oz)            Reviewed and updated as needed this visit by Provider         Review of Systems   ROS COMP: CONSTITUTIONAL: NEGATIVE for fever, chills, change in weight  EYES: NEGATIVE for vision changes or irritation  ENT/MOUTH: NEGATIVE for ear, mouth and throat problems  RESP: NEGATIVE for significant cough or SOB  CV: NEGATIVE for chest pain, palpitations or peripheral edema  GI: NEGATIVE for nausea, abdominal pain, heartburn, or change in bowel habits  : NEGATIVE for frequency, dysuria, or hematuria  MUSCULOSKELETAL: NEGATIVE for significant arthralgias or myalgia  NEURO: NEGATIVE for weakness, dizziness or paresthesias  ENDOCRINE: NEGATIVE for temperature intolerance, skin/hair changes  HEME: NEGATIVE for bleeding problems  PSYCHIATRIC: NEGATIVE for changes in mood or affect      Objective    BP (!) 160/86   Pulse 89   Temp 98  F (36.7  C) (Oral)   Resp 15   Ht 1.651 m (5' 5\")   Wt 71.8 kg (158 lb 4.8 oz)   SpO2 98%   BMI 26.34 kg/m    Body mass index is 26.34 kg/m .  Physical Exam   GENERAL: healthy, alert and no distress  EYES: Eyes grossly normal to inspection, PERRL and conjunctivae and sclerae normal  HENT: ear canals and TM's normal, nose and mouth without ulcers or lesions  NECK: no adenopathy, no asymmetry, masses, or scars and thyroid normal to palpation  RESP: lungs clear to auscultation - no rales, rhonchi or wheezes  CV: regular rate and rhythm, normal S1 S2, no S3 or S4, no click or rub, no peripheral edema and peripheral pulses strong  MS: no gross musculoskeletal defects noted, no edema  NEURO: No focal changes.  PSYCH: mentation appears normal, affect normal/bright        Assessment & Plan     1. Essential hypertension, benign  Poorly controlled and appears compliant.  Suggest adding " amlodipine 5 mg daily recheck blood pressure in 8 weeks  - lisinopril (PRINIVIL/ZESTRIL) 20 MG tablet; Take 1 tablet (20 mg) by mouth 2 times daily  Dispense: 180 tablet; Refill: 3  - Comprehensive metabolic panel; Future  - amLODIPine (NORVASC) 5 MG tablet; Take 1 tablet (5 mg) by mouth daily  Dispense: 90 tablet; Refill: 1    2. Hyperlipidemia LDL goal <130  Labs ordered as fasting per routine and patient will schedule fasting lab appointment  - Lipid Profile; Future    3. Visit for screening mammogram  Ordered this routine screening and again advised to schedule accordingly  - MA SCREENING DIGITAL BILAT - Future  (s+30); Future    4. Screen for colon cancer  ADVISED COLONOSCOPY FOR ROUTINE PREVENTATIVE CARE.  - GASTROENTEROLOGY ADULT REF PROCEDURE ONLY Lou Daria [phone]; No Provider Preference    5. Asymptomatic postmenopausal status  Ordered as routine screening and patient again advised to obtain  - DEXA HIP/PELVIS/SPINE - Future; Future       See Patient Instructions patient was advised to obtain routine vaccinations updates including flu shot and shingles vaccination but she has declined.    Return in about 2 months (around 3/6/2020) for BP Recheck, routine mammogram, screening, follow-up colonoscopy.    Timo Ambrocio MD  [facility]

## 2020-06-29 DIAGNOSIS — I10 ESSENTIAL HYPERTENSION, BENIGN: ICD-10-CM

## 2020-06-30 RX ORDER — AMLODIPINE BESYLATE 5 MG/1
5 TABLET ORAL DAILY
Qty: 90 TABLET | Refills: 1 | Status: SHIPPED | OUTPATIENT
Start: 2020-06-30 | End: 2021-04-02

## 2020-06-30 NOTE — TELEPHONE ENCOUNTER
Routing refill request to provider for review/approval because:   out of range:  blood pressure  Labs not current:  creat

## 2020-12-26 DIAGNOSIS — I10 ESSENTIAL HYPERTENSION, BENIGN: ICD-10-CM

## 2020-12-29 RX ORDER — LISINOPRIL 20 MG/1
20 TABLET ORAL 2 TIMES DAILY
Qty: 180 TABLET | Refills: 3 | OUTPATIENT
Start: 2020-12-29

## 2020-12-29 RX ORDER — AMLODIPINE BESYLATE 5 MG/1
5 TABLET ORAL DAILY
Qty: 90 TABLET | Refills: 1 | OUTPATIENT
Start: 2020-12-29

## 2020-12-29 NOTE — TELEPHONE ENCOUNTER
Routing refill request to provider for review/approval because:  Blood pressure out of range

## 2020-12-30 DIAGNOSIS — I10 ESSENTIAL HYPERTENSION, BENIGN: ICD-10-CM

## 2020-12-31 RX ORDER — LISINOPRIL 20 MG/1
20 TABLET ORAL 2 TIMES DAILY
Qty: 180 TABLET | Refills: 3 | OUTPATIENT
Start: 2020-12-31

## 2020-12-31 NOTE — TELEPHONE ENCOUNTER
Routing refill request to provider for review/approval because:  out of range:  blood pressure  Labs not current:  MARYA Wallace

## 2021-01-05 DIAGNOSIS — I10 ESSENTIAL HYPERTENSION, BENIGN: ICD-10-CM

## 2021-01-06 RX ORDER — LISINOPRIL 20 MG/1
20 TABLET ORAL 2 TIMES DAILY
Qty: 180 TABLET | Refills: 3 | OUTPATIENT
Start: 2021-01-06

## 2021-01-06 NOTE — TELEPHONE ENCOUNTER
Routing refill request to provider for review/approval because:  Labs not current:  BMP  Patient needs to be seen because it has been more than 1 year since last office visit.  No current BP readings    Alem KEMPN, RN, PHN

## 2021-01-08 DIAGNOSIS — I10 ESSENTIAL HYPERTENSION, BENIGN: ICD-10-CM

## 2021-01-09 NOTE — TELEPHONE ENCOUNTER
"Failed protocol.  Myra HAINESRN BSN  [facility]  [phone]  Requested Prescriptions   Pending Prescriptions Disp Refills     lisinopril (ZESTRIL) 20 MG tablet 180 tablet 3     Sig: Take 1 tablet (20 mg) by mouth 2 times daily       ACE Inhibitors (Including Combos) Protocol Failed - 1/8/2021  1:16 PM        Failed - Blood pressure under 140/90 in past 12 months     BP Readings from Last 3 Encounters:   01/06/20 (!) 160/86   11/05/18 148/86   03/21/18 110/80                 Failed - Recent (12 mo) or future (30 days) visit within the authorizing provider's specialty     Patient has had an office visit with the authorizing provider or a provider within the authorizing providers department within the previous 12 mos or has a future within next 30 days. See \"Patient Info\" tab in inbasket, or \"Choose Columns\" in Meds & Orders section of the refill encounter.              Failed - Normal serum creatinine on file in past 12 months     Recent Labs   Lab Test 10/24/17  0927   CR 0.71       Ok to refill medication if creatinine is low          Failed - Normal serum potassium on file in past 12 months     Recent Labs   Lab Test 10/24/17  0927   POTASSIUM 4.1             Passed - Medication is active on med list        Passed - Patient is age 18 or older        Passed - No active pregnancy on record        Passed - No positive pregnancy test within past 12 months             "

## 2021-01-10 RX ORDER — LISINOPRIL 20 MG/1
20 TABLET ORAL 2 TIMES DAILY
Qty: 180 TABLET | Refills: 3 | OUTPATIENT
Start: 2021-01-10

## 2021-01-15 DIAGNOSIS — I10 ESSENTIAL HYPERTENSION, BENIGN: ICD-10-CM

## 2021-01-15 RX ORDER — LISINOPRIL 20 MG/1
20 TABLET ORAL 2 TIMES DAILY
Qty: 180 TABLET | Refills: 3 | OUTPATIENT
Start: 2021-01-15

## 2021-01-15 NOTE — TELEPHONE ENCOUNTER
Routing refill request to provider for review/approval because:  Blood pressure out of range   Needs labs and visit

## 2021-01-20 DIAGNOSIS — I10 ESSENTIAL HYPERTENSION, BENIGN: ICD-10-CM

## 2021-01-21 RX ORDER — LISINOPRIL 20 MG/1
20 TABLET ORAL 2 TIMES DAILY
Qty: 180 TABLET | Refills: 0 | Status: SHIPPED | OUTPATIENT
Start: 2021-01-21 | End: 2021-05-03

## 2021-03-26 DIAGNOSIS — I10 ESSENTIAL HYPERTENSION, BENIGN: ICD-10-CM

## 2021-03-26 RX ORDER — AMLODIPINE BESYLATE 5 MG/1
5 TABLET ORAL DAILY
Qty: 90 TABLET | Refills: 1 | OUTPATIENT
Start: 2021-03-26

## 2021-03-26 NOTE — TELEPHONE ENCOUNTER
Failed protocol.  please route to  team if patient needs an appointment     Myra HAINESRN BSN  [facility]  [phone]

## 2021-03-26 NOTE — LETTER
[facility]  [address] [place] [address]  [phone]      3/29/2021       Leticia Forte  [address] [facility] [address]        Dear Leticia,  You are due for a follow up appointment with Dr. Ambrocio regarding your prescription for amLODIPine (NORVASC) 5 MG tablet. Please call to schedule, [phone].       Sincerely,      Timo Ambrocio MD/[facility]  Internal Medicine

## 2021-03-30 DIAGNOSIS — I10 ESSENTIAL HYPERTENSION, BENIGN: ICD-10-CM

## 2021-03-30 NOTE — LETTER
[facility]  [address] [id number] [phone]            Leticia Forte  [address] [facility] [address]        March 31, 2021    Dear Leticia,    Your recent refill request for lisinopril (ZESTRIL) 20 MG tablet has been denied, as it appears you are overdue for you annual visit and labs.    Taking care of your health is important to us and we look forward to seeing you in the near future.  Please call us at [phone] or [phone] (or use [employer]) to schedule an appointment.     Please disregard this notice if you have already made an appointment.    Sincerely,        [facility]

## 2021-03-31 DIAGNOSIS — I10 ESSENTIAL HYPERTENSION, BENIGN: ICD-10-CM

## 2021-03-31 RX ORDER — LISINOPRIL 20 MG/1
20 TABLET ORAL 2 TIMES DAILY
Qty: 180 TABLET | Refills: 0 | OUTPATIENT
Start: 2021-03-31

## 2021-03-31 RX ORDER — AMLODIPINE BESYLATE 5 MG/1
5 TABLET ORAL DAILY
Qty: 90 TABLET | Refills: 1 | Status: CANCELLED | OUTPATIENT
Start: 2021-03-31

## 2021-03-31 NOTE — TELEPHONE ENCOUNTER
Routing refill request to provider for review/approval because:  Labs not current:  BMP  Patient needs to be seen because it has been more than 1 year since last office visit.

## 2021-04-02 ENCOUNTER — TELEPHONE (OUTPATIENT)
Dept: INTERNAL MEDICINE | Facility: CLINIC | Age: 76
End: 2021-04-02

## 2021-04-02 DIAGNOSIS — I10 ESSENTIAL HYPERTENSION, BENIGN: ICD-10-CM

## 2021-04-02 RX ORDER — AMLODIPINE BESYLATE 5 MG/1
5 TABLET ORAL DAILY
Qty: 90 TABLET | Refills: 1 | Status: SHIPPED | OUTPATIENT
Start: 2021-04-02 | End: 2021-06-10

## 2021-04-02 NOTE — TELEPHONE ENCOUNTER
Routing refill request to provider for review/approval because:  Labs not current:  BMP  Patient needs to be seen because it has been more than 1 year since last office visit.  BP readings not current

## 2021-04-02 NOTE — TELEPHONE ENCOUNTER
Please note the prescription for antihypertensive therapy should not have been refilled as wrong button pushed as patient has not been seen in the clinic.  Please contact pharmacy and inform that prescription should not be filled and then make an appointment for patient in clinic

## 2021-04-02 NOTE — TELEPHONE ENCOUNTER
Pharmacy informed.     Called patient. No answer unable to leave a VM to call back and schedule. Will try again later    Alem KEMPN, RN, PHN

## 2021-04-07 DIAGNOSIS — I10 ESSENTIAL HYPERTENSION, BENIGN: ICD-10-CM

## 2021-04-07 NOTE — LETTER
[facility]  [address] [place] [address]  [phone]      4/16/2021       Leticia Forte  [address] [facility] [address]        Dear Leticia,  You are due for a follow up appointment with Dr. Ambrocio regarding your prescription for lisinopril (ZESTRIL) 20 MG tablet Please call to schedule, [phone].       Sincerely,      Timo Ambrocio MD/[facility], [facility]  Internal Medicine

## 2021-04-11 RX ORDER — LISINOPRIL 20 MG/1
20 TABLET ORAL 2 TIMES DAILY
Qty: 180 TABLET | Refills: 0 | OUTPATIENT
Start: 2021-04-11

## 2021-04-11 NOTE — TELEPHONE ENCOUNTER
"    Failed protocol.  please route to  team if patient needs an appointment     Myra HAINESRN BSN  [facility]  [phone]    Requested Prescriptions   Pending Prescriptions Disp Refills     lisinopril (ZESTRIL) 20 MG tablet 180 tablet 0     Sig: Take 1 tablet (20 mg) by mouth 2 times daily       ACE Inhibitors (Including Combos) Protocol Failed - 4/7/2021  2:43 PM        Failed - Blood pressure under 140/90 in past 12 months     BP Readings from Last 3 Encounters:   01/06/20 (!) 160/86   11/05/18 148/86   03/21/18 110/80                 Failed - Recent (12 mo) or future (30 days) visit within the authorizing provider's specialty     Patient has had an office visit with the authorizing provider or a provider within the authorizing providers department within the previous 12 mos or has a future within next 30 days. See \"Patient Info\" tab in inbasket, or \"Choose Columns\" in Meds & Orders section of the refill encounter.              Failed - Normal serum creatinine on file in past 12 months     Recent Labs   Lab Test 10/24/17  0927   CR 0.71       Ok to refill medication if creatinine is low          Failed - Normal serum potassium on file in past 12 months     Recent Labs   Lab Test 10/24/17  0927   POTASSIUM 4.1             Passed - Medication is active on med list        Passed - Patient is age 18 or older        Passed - No active pregnancy on record        Passed - No positive pregnancy test within past 12 months             "

## 2021-04-12 DIAGNOSIS — I10 ESSENTIAL HYPERTENSION, BENIGN: ICD-10-CM

## 2021-04-14 RX ORDER — LISINOPRIL 20 MG/1
20 TABLET ORAL 2 TIMES DAILY
Qty: 180 TABLET | Refills: 0 | OUTPATIENT
Start: 2021-04-14

## 2021-05-03 ENCOUNTER — VIRTUAL VISIT (OUTPATIENT)
Dept: INTERNAL MEDICINE | Facility: CLINIC | Age: 76
End: 2021-05-03
Payer: COMMERCIAL

## 2021-05-03 DIAGNOSIS — E78.5 HYPERLIPIDEMIA LDL GOAL <130: ICD-10-CM

## 2021-05-03 DIAGNOSIS — Z12.11 COLON CANCER SCREENING: ICD-10-CM

## 2021-05-03 DIAGNOSIS — Z78.0 ASYMPTOMATIC POSTMENOPAUSAL STATUS: ICD-10-CM

## 2021-05-03 DIAGNOSIS — Z12.31 VISIT FOR SCREENING MAMMOGRAM: ICD-10-CM

## 2021-05-03 DIAGNOSIS — I10 ESSENTIAL HYPERTENSION, BENIGN: Primary | ICD-10-CM

## 2021-05-03 PROCEDURE — 99442 PR PHYSICIAN TELEPHONE EVALUATION 11-20 MIN: CPT | Mod: 95 | Performed by: INTERNAL MEDICINE

## 2021-05-03 RX ORDER — LISINOPRIL 20 MG/1
20 TABLET ORAL 2 TIMES DAILY
Qty: 180 TABLET | Refills: 0 | Status: SHIPPED | OUTPATIENT
Start: 2021-05-03 | End: 2021-06-24

## 2021-05-03 NOTE — PROGRESS NOTES
Leticia is a 76 year old who is being evaluated via a billable telephone visit.      What phone number would you like to be contacted at? [phone]  How would you like to obtain your AVS? Juddhart    Assessment & Plan :    Essential hypertension, benign  Stable on therapy as noted    Hyperlipidemia LDL goal <130  Labs as ordered    Visit for screening mammogram  Ordered as screening    Colon cancer screening  ADVISED COLONOSCOPY FOR ROUTINE PREVENTATIVE CARE.    Asymptomatic postmenopausal status  Advised DEXA as screening    Work on weight loss  Regular exercise  Advised updated vaccinations    Return in about 6 months (around 11/3/2021) for BP Recheck, follow-up colonoscopy, routine mammogram, screening.    Timo Ambrocio MD  [facility]    Subjective :    Leticia is a 76 year old who presents for the following health issues     HPI     Hypertension Follow-up      Do you check your blood pressure regularly outside of the clinic? No     Are you following a low salt diet? Yes    Are your blood pressures ever more than 140 on the top number (systolic) OR more   than 90 on the bottom number (diastolic), for example 140/90? Unknown    Review of Systems   CONSTITUTIONAL: NEGATIVE for fever, chills, change in weight  EYES: NEGATIVE for vision changes or irritation  ENT/MOUTH: NEGATIVE for ear, mouth and throat problems  RESP: NEGATIVE for significant cough or SOB  CV: NEGATIVE for chest pain, palpitations or peripheral edema  GI: NEGATIVE for nausea, abdominal pain, heartburn, or change in bowel habits  : NEGATIVE for frequency, dysuria, or hematuria  MUSCULOSKELETAL: NEGATIVE for significant arthralgias or myalgia  NEURO: NEGATIVE for weakness, dizziness or paresthesias  HEME: NEGATIVE for bleeding problems  PSYCHIATRIC: NEGATIVE for changes in mood or affect      Objective       Vitals:  No vitals were obtained today due to virtual visit.  BP at home:  Good per patient but not  checking    Physical Exam   healthy, alert and no distress  PSYCH: Alert and oriented times 3; coherent speech, normal   rate and volume, able to articulate logical thoughts, able   to abstract reason, no tangential thoughts, no hallucinations   or delusions  Her affect is normal  RESP: No cough, no audible wheezing, able to talk in full sentences  Remainder of exam unable to be completed due to telephone visits        Phone call duration: 14 minutes

## 2021-06-10 ENCOUNTER — VIRTUAL VISIT (OUTPATIENT)
Dept: INTERNAL MEDICINE | Facility: CLINIC | Age: 76
End: 2021-06-10
Payer: COMMERCIAL

## 2021-06-10 DIAGNOSIS — Z09 HOSPITAL DISCHARGE FOLLOW-UP: Primary | ICD-10-CM

## 2021-06-10 DIAGNOSIS — U07.1 CLINICAL DIAGNOSIS OF COVID-19: ICD-10-CM

## 2021-06-10 DIAGNOSIS — I10 ESSENTIAL HYPERTENSION, BENIGN: ICD-10-CM

## 2021-06-10 PROCEDURE — 99442 PR PHYSICIAN TELEPHONE EVALUATION 11-20 MIN: CPT | Mod: 95 | Performed by: INTERNAL MEDICINE

## 2021-06-10 NOTE — PROGRESS NOTES
Leticia is a 76 year old who is being evaluated via a billable telephone visit.      What phone number would you like to be contacted at? [phone]  How would you like to obtain your AVS? Mail a copy    Assessment & Plan     Hospital discharge follow-up  Stable and doing well as noted    Clinical diagnosis of COVID-19  Resolving symptoms as noted    Essential hypertension, benign  Stable on therapy as noted    See Patient Instructions    Return in about 6 months (around 12/10/2021) for Lab Work appointment, BP Recheck.    Timo Ambrocio MD  [facility]    Subjective :    Leticia is a 76 year old who presents for the following health issues     HPI:    Hospital Follow-up Visit:    Hospital/Nursing Home/ Rehab Facility: [facility]  Date of Admission: 5/24/21   Date of Discharge: 5/25/21  Reason(s) for Admission: Pneumonia due to Covid-19 virus, fall, multiple contusion       Was your hospitalization related to COVID-19? YES   How are you feeling today? Better  In the past 24 hours have you had shortness of breath when speaking, walking, or climbing stairs? I don't have breathing problems  Do you have a cough? I don't have a cough   Are you having any other symptoms? Fatigue   Do you have any other stressors you would like to discuss with your provider? No         Was the patient in the ICU or did the patient experience delirium during hospitalization?  No          Problems taking medications regularly:  None  Medication changes since discharge: None  Problems adhering to non-medication therapy:  None    Summary of hospitalization:  CareEverywhere information obtained and reviewed  Diagnostic Tests/Treatments reviewed.  Follow up needed: none  Other Healthcare Providers Involved in Patient s Care:         None  Update since discharge: improved.       Post Discharge Medication Reconciliation: discharge medications reconciled, continue medications without change.  Plan of care  communicated with patient              Hospital Course:    Pneumonia due to COVID-19 virus with hypoxia  Patient had borderline hypoxia on admission with room air O2 saturation of 90% but tachycardia with heart rates in the low one teens. Given IV fluid bolus in the emergency room with resolution of tachycardia. She was started on oral Decadron and by the following day was able to be weaned to room air with O2 saturations in the high 90s. No desaturation with ambulation. Did not receive remdesivir as she was outside of treatment window and in addition refused to consider.    Able to locate previous outpatient COVID test from 05/19 which was positive. Symptoms began approximately 5/13.    Due to rapid improvement the patient was discharged the following day. Steroids were not continued at the time of discharge. Symptoms began 5/13, has completed isolation required 10 days of isolation. Discharge with pulse oximeter.    Advised to pursue COVID vaccination in future. Will follow up with primary care physician within 1 week.    Essential hypertension  -continue lisinopril      Review of Systems   CONSTITUTIONAL: NEGATIVE for fever, chills, change in weight  EYES: NEGATIVE for vision changes or irritation  ENT/MOUTH: NEGATIVE for ear, mouth and throat problems  RESP: NEGATIVE for significant cough or SOB  CV: NEGATIVE for chest pain, palpitations or peripheral edema  GI: NEGATIVE for nausea, abdominal pain, heartburn, or change in bowel habits  : NEGATIVE for frequency, dysuria, or hematuria  NEURO: NEGATIVE for weakness, dizziness or paresthesias  HEME: NEGATIVE for bleeding problems  PSYCHIATRIC: NEGATIVE for changes in mood or affect      Objective       Vitals:  No vitals were obtained today due to virtual visit.    Physical Exam   healthy, alert and no distress  PSYCH: Alert and oriented times 3; coherent speech, normal   rate and volume, able to articulate logical thoughts, able   to abstract reason, no  tangential thoughts, no hallucinations   or delusions  Her affect is normal  RESP: No cough, no audible wheezing, able to talk in full sentences  Remainder of exam unable to be completed due to telephone visits          Phone call duration: 14 minutes

## 2021-06-10 NOTE — PROGRESS NOTES
"Leticia is a 76 year old who is being evaluated via a billable telephone visit.      What phone number would you like to be contacted at? [phone]  How would you like to obtain your AVS? Jania    {PROVIDER CHARTING PREFERENCE:338932}    Subjective   Leticia is a 76 year old who presents for the following health issues     HPI     Hypertension Follow-up      Do you check your blood pressure regularly outside of the clinic? { :883732}     Are you following a low salt diet? { :915298}    Are your blood pressures ever more than 140 on the top number (systolic) OR more   than 90 on the bottom number (diastolic), for example 140/90? { :579166}      How many servings of fruits and vegetables do you eat daily?  { :009855}    On average, how many sweetened beverages do you drink each day (Examples: soda, juice, sweet tea, etc.  Do NOT count diet or artificially sweetened beverages)?   { 1-11:652964}    How many days per week do you exercise enough to make your heart beat faster? { :820516}    How many minutes a day do you exercise enough to make your heart beat faster? { :829277}    How many days per week do you miss taking your medication? {0-7 :122632}    {additonal problems for provider to add (Optional):621703}    Review of Systems   {ROS COMP (Optional):991441}      Objective           Vitals:  No vitals were obtained today due to virtual visit.    Physical Exam   {GENERAL APPEARANCE:50::\"healthy\",\"alert\",\"no distress\"}  PSYCH: Alert and oriented times 3; coherent speech, normal   rate and volume, able to articulate logical thoughts, able   to abstract reason, no tangential thoughts, no hallucinations   or delusions  Her affect is { :6674807::\"normal\"}  RESP: No cough, no audible wheezing, able to talk in full sentences  Remainder of exam unable to be completed due to telephone visits    {Diagnostic Test Results (Optional):637026}    {AMBULATORY ATTESTATION (Optional):424325}        Phone call duration: *** minutes  "

## 2021-06-24 DIAGNOSIS — I10 ESSENTIAL HYPERTENSION, BENIGN: ICD-10-CM

## 2021-06-24 RX ORDER — LISINOPRIL 20 MG/1
20 TABLET ORAL 2 TIMES DAILY
Qty: 180 TABLET | Refills: 0 | Status: SHIPPED | OUTPATIENT
Start: 2021-06-24

## 2021-06-24 NOTE — TELEPHONE ENCOUNTER
Lisinopril   Routing refill request to provider for review/approval because:  Labs not current:  CR, Potassium  BP not current     Creatinine   Date Value Ref Range Status   10/24/2017 0.71 0.52 - 1.04 mg/dL Final     Potassium   Date Value Ref Range Status   10/24/2017 4.1 3.4 - 5.3 mmol/L Final     BP Readings from Last 3 Encounters:   01/06/20 (!) 160/86   11/05/18 148/86   03/21/18 110/80